# Patient Record
Sex: MALE | Race: OTHER | Employment: FULL TIME
[De-identification: names, ages, dates, MRNs, and addresses within clinical notes are randomized per-mention and may not be internally consistent; named-entity substitution may affect disease eponyms.]

---

## 2023-09-20 ENCOUNTER — APPOINTMENT (OUTPATIENT)
Facility: HOSPITAL | Age: 23
End: 2023-09-20
Payer: COMMERCIAL

## 2023-09-20 ENCOUNTER — HOSPITAL ENCOUNTER (EMERGENCY)
Facility: HOSPITAL | Age: 23
Discharge: HOME | End: 2023-09-20
Attending: STUDENT IN AN ORGANIZED HEALTH CARE EDUCATION/TRAINING PROGRAM
Payer: COMMERCIAL

## 2023-09-20 VITALS
HEIGHT: 71 IN | SYSTOLIC BLOOD PRESSURE: 124 MMHG | RESPIRATION RATE: 15 BRPM | DIASTOLIC BLOOD PRESSURE: 76 MMHG | HEART RATE: 65 BPM | TEMPERATURE: 98.2 F | WEIGHT: 145 LBS | OXYGEN SATURATION: 100 % | BODY MASS INDEX: 20.3 KG/M2

## 2023-09-20 DIAGNOSIS — J93.83 SPONTANEOUS PNEUMOTHORAX: Primary | ICD-10-CM

## 2023-09-20 PROCEDURE — 71046 X-RAY EXAM CHEST 2 VIEWS: CPT

## 2023-09-20 PROCEDURE — 99285 EMERGENCY DEPT VISIT HI MDM: CPT

## 2023-09-20 PROCEDURE — 93005 ELECTROCARDIOGRAM TRACING: CPT | Performed by: STUDENT IN AN ORGANIZED HEALTH CARE EDUCATION/TRAINING PROGRAM

## 2023-09-20 RX ORDER — KETOROLAC TROMETHAMINE 30 MG/ML
15 INJECTION, SOLUTION INTRAMUSCULAR; INTRAVENOUS ONCE
Status: DISCONTINUED | OUTPATIENT
Start: 2023-09-20 | End: 2023-09-21 | Stop reason: HOSPADM

## 2023-09-20 ASSESSMENT — PAIN SCALES - GENERAL
PAINLEVEL_OUTOF10: 3
PAINLEVEL_OUTOF10: 1

## 2023-09-20 ASSESSMENT — PAIN - FUNCTIONAL ASSESSMENT: PAIN_FUNCTIONAL_ASSESSMENT: 0-10

## 2023-09-20 ASSESSMENT — PAIN DESCRIPTION - LOCATION
LOCATION: BACK
LOCATION: CHEST

## 2023-09-20 NOTE — ED PROVIDER NOTES
EMERGENCY DEPARTMENT HISTORY AND PHYSICAL EXAM      Date: 9/20/2023  Patient Name: Ayaan Santiago    History of Presenting Illness     Chief Complaint   Patient presents with    Shortness of Breath     Patient arrives via EMS with sudden onset of SOB; h/o collapsed lung. RR even non labored on arrival. Vitals stable en route by EMS. HPI: History From: patient, History limited by: none  Ayaan Santiago, 26 y.o. male presents to the ED with cc of chest pain and shortness of breath. He was walking outside for work, began to have right-sided back pain that felt like a pulling tightness, radiated through to his right chest.  Began to feel short of breath, and lightheaded. He reports a history of a spontaneous pneumothorax on the left for years ago, and states this feels similar. He now feels much better since coming to the emergency department. He denies any unilateral leg pain or leg swelling, no recent coughing or fever. He denies any other past medical history, and does not take any daily medications. His prior pneumothorax required him to be observed on oxygen, however did not require procedure any follow-up beyond repeat chest x-ray. There are no other complaints, changes, or physical findings at this time. PCP: No primary care provider on file. No current facility-administered medications on file prior to encounter. No current outpatient medications on file prior to encounter. Past History     Past Medical History:  No past medical history on file. Past Surgical History:  No past surgical history on file. Family History:  No family history on file. Social History: Allergies:  No Known Allergies      Physical Exam   Physical Exam  Constitutional:       General: He is not in acute distress. Appearance: He is not toxic-appearing. HENT:      Head: Normocephalic. Cardiovascular:      Rate and Rhythm: Normal rate and regular rhythm.    Pulmonary:

## 2023-09-23 LAB
EKG ATRIAL RATE: 61 BPM
EKG DIAGNOSIS: NORMAL
EKG P AXIS: 3 DEGREES
EKG P-R INTERVAL: 130 MS
EKG Q-T INTERVAL: 382 MS
EKG QRS DURATION: 86 MS
EKG QTC CALCULATION (BAZETT): 384 MS
EKG R AXIS: 83 DEGREES
EKG T AXIS: 71 DEGREES
EKG VENTRICULAR RATE: 61 BPM